# Patient Record
(demographics unavailable — no encounter records)

---

## 2025-01-10 NOTE — PHYSICAL EXAM
[Normocephalic] : normocephalic [Atraumatic] : atraumatic [EOMI] : extra ocular movement intact [Supple] : supple [No Supraclavicular Adenopathy] : no supraclavicular adenopathy [No Cervical Adenopathy] : no cervical adenopathy [No JVD] : no jugular venous distension [Examined in the supine and seated position] : examined in the supine and seated position [Symmetrical] : symmetrical [No Axillary Lymphadenopathy] : no left axillary lymphadenopathy [de-identified] :  Breathing comfortably on room air. [de-identified] : well-healed periareolar scar, inferior vertical scar with some scabbing consistent with site of previous drainage, no active drainage noted, ~2cm mass in 12:00, no overlying skin changes [de-identified] : well-healed periareolar scar, inferior vertical scar with some scabbing consistent with site of previous drainage, no active drainage noted, tenderness and firmness in lateral area, consistent with post-surgical changes.

## 2025-01-10 NOTE — HISTORY OF PRESENT ILLNESS
[FreeTextEntry1] : Chief Complaint: Bilateral Breast Pain PMD: Kaye Herring  HPI:   66F with pmh HTN, HLD, prediabetes s/p bilateral breast reduction (2.5 months prior) presents with bilateral breast pain. Patient reports being in normal state of health with no breast-related complaints leading up to her breast reduction performed by Dr. Zaida Leroy ~2.5 months prior. She reports that since surgery, she has had debilitating pain in her breasts that have been impacting her quality of life. She reports that her left breast hurts more than the right, especially in the lateral area. She describes the pain as sharp and internally pulling. She also reports firmness in the right breast 12:00 area. She also reports drainage from the inferior scars in bilateral breasts that ranges for clear to white in color. She has had multiple discussions with her surgeon and the symptoms have been attributed to post-surgical changes and sutures. Patient feels overwhelmed and depressed by her symptoms and therefore presents for another opinion. Patient has never had any breast complaints, masses or biopsies prior to her recent operation.   ------Breast Imaging History------   2024 - Bilateral Screening MM (BIRADS 1) ([facility]) - Almost entirely fatty - No suspicious findings       ------Breast Procedures------   2024 - Bilateral Breast Reduction   || Past Medical History ||  HTN, HLD, prediabetes  || Past Surgical History ||  , MELINDA (has on ovary), cholecystectomy, back surgery  || Gynecologic History ||   Menarche 13-14 Postmenopausal First Birth: Age 17 Pregnancies: 4 Births: 3 Breastfeeding: No OCPs: No HRT: No   || Family History ||   No family history of breast, ovarian, prostate or pancreatic cancer.   || Social History ||   Alcohol: No Tobacco: No

## 2025-01-10 NOTE — ASSESSMENT
[FreeTextEntry1] : 66F with pmh HTN, HLD, prediabetes s/p bilateral breast reduction (2.5 months prior) presents with bilateral breast pain.  Had an extensive discussion with patient regarding her symptoms. Explained that her breast pain is related to her recent surgical procedure and that it may improve over time as tissue remodeling occurs. We reviewed conservative treatment strategies for her breast pain including wearing a supportive bra both day and night, warm/cold compresses, massage and OTC analgesics such as Tylenol and Ibuprofen. I also mentioned OTC supplements such as evening primrose oil (3g per day), which I typically reserve for patients with non-surgical related breast pain but she can attempt for alleviation given her refractory symptoms. We also discussed a topical diclofenac ointment which can be prescribed if other measures fail. I also reminded her that she is due for her annual screening mammogram but patient wishes to defer due to her pain. I will obtain an US at this time to evaluate her breasts given her symptoms.   - Bilateral Breast US - Return to office in 1 month